# Patient Record
Sex: FEMALE | Race: WHITE | NOT HISPANIC OR LATINO | Employment: OTHER | URBAN - NONMETROPOLITAN AREA
[De-identification: names, ages, dates, MRNs, and addresses within clinical notes are randomized per-mention and may not be internally consistent; named-entity substitution may affect disease eponyms.]

---

## 2017-04-20 ENCOUNTER — TRANSCRIBE ORDERS (OUTPATIENT)
Dept: ADMINISTRATIVE | Facility: HOSPITAL | Age: 57
End: 2017-04-20

## 2017-04-20 DIAGNOSIS — Z13.9 SCREENING: Primary | ICD-10-CM

## 2017-05-22 ENCOUNTER — HOSPITAL ENCOUNTER (OUTPATIENT)
Dept: MAMMOGRAPHY | Facility: HOSPITAL | Age: 57
Discharge: HOME | End: 2017-05-22
Admitting: NURSE PRACTITIONER

## 2017-05-22 DIAGNOSIS — Z13.9 SCREENING: ICD-10-CM

## 2017-05-22 PROCEDURE — 77063 BREAST TOMOSYNTHESIS BI: CPT

## 2017-05-22 PROCEDURE — G0202 SCR MAMMO BI INCL CAD: HCPCS

## 2017-06-13 ENCOUNTER — OFFICE VISIT (OUTPATIENT)
Dept: NEUROLOGY | Facility: CLINIC | Age: 57
End: 2017-06-13

## 2017-06-13 VITALS
DIASTOLIC BLOOD PRESSURE: 60 MMHG | HEIGHT: 59 IN | HEART RATE: 80 BPM | SYSTOLIC BLOOD PRESSURE: 100 MMHG | WEIGHT: 165 LBS | OXYGEN SATURATION: 98 % | BODY MASS INDEX: 33.26 KG/M2

## 2017-06-13 DIAGNOSIS — G43.009 MIGRAINE WITHOUT AURA AND WITHOUT STATUS MIGRAINOSUS, NOT INTRACTABLE: Primary | ICD-10-CM

## 2017-06-13 PROCEDURE — 99213 OFFICE O/P EST LOW 20 MIN: CPT | Performed by: NURSE PRACTITIONER

## 2017-09-19 ENCOUNTER — TRANSCRIBE ORDERS (OUTPATIENT)
Dept: ADMINISTRATIVE | Facility: HOSPITAL | Age: 57
End: 2017-09-19

## 2017-09-19 DIAGNOSIS — J44.9 CHRONIC OBSTRUCTIVE PULMONARY DISEASE, UNSPECIFIED COPD TYPE (HCC): Primary | ICD-10-CM

## 2018-06-13 ENCOUNTER — OFFICE VISIT (OUTPATIENT)
Dept: NEUROLOGY | Facility: CLINIC | Age: 58
End: 2018-06-13

## 2018-06-13 VITALS
BODY MASS INDEX: 33.06 KG/M2 | WEIGHT: 164 LBS | HEIGHT: 59 IN | DIASTOLIC BLOOD PRESSURE: 78 MMHG | HEART RATE: 102 BPM | SYSTOLIC BLOOD PRESSURE: 112 MMHG | OXYGEN SATURATION: 100 %

## 2018-06-13 DIAGNOSIS — G43.009 MIGRAINE WITHOUT AURA AND WITHOUT STATUS MIGRAINOSUS, NOT INTRACTABLE: Primary | ICD-10-CM

## 2018-06-13 PROCEDURE — 99212 OFFICE O/P EST SF 10 MIN: CPT | Performed by: NURSE PRACTITIONER

## 2018-06-13 RX ORDER — PHENTERMINE HYDROCHLORIDE 37.5 MG/1
37.5 TABLET ORAL DAILY
Refills: 0 | COMMUNITY
Start: 2018-06-08 | End: 2019-10-17

## 2018-06-13 NOTE — PROGRESS NOTES
Subjective   Alicia Bender is a 57 y.o. female     Chief Complaint   Patient presents with   • Follow-up     Pt is here for a 1 year Fu for migraines. Pt states that she has done really well and has only had one or two migraines.        History of Present Illness     Pt is a pleasant 57 yr old female in clinic to follow up migraines Patient is very pleased with current treatment for her migraines.  Patient uses Topamax 200 mg twice a day with Imitrex.  She states she's only had 1-2 migraines in a year and she was having more than 15-16 a month.  States she is no longer having any side effects with work thought issues and again she is very pleased with current treatment.    The following portions of the patient's history were reviewed and updated as appropriate: allergies, current medications, past family history, past medical history, past social history, past surgical history and problem list.    Review of Systems   Constitutional: Negative for chills and fever.   HENT: Negative for congestion, ear pain, hearing loss, rhinorrhea and sore throat.    Eyes: Negative for pain, discharge and redness.   Respiratory: Negative for cough, shortness of breath, wheezing and stridor.    Cardiovascular: Negative for chest pain, palpitations and leg swelling.   Gastrointestinal: Negative for abdominal pain, constipation, nausea and vomiting.   Endocrine: Negative for cold intolerance, heat intolerance and polyphagia.   Genitourinary: Negative for dysuria, flank pain, frequency and urgency.   Musculoskeletal: Negative for joint swelling, myalgias, neck pain and neck stiffness.   Skin: Negative for pallor, rash and wound.   Allergic/Immunologic: Negative for environmental allergies.   Neurological: Positive for headaches. Negative for dizziness, tremors, seizures, syncope, facial asymmetry, speech difficulty, weakness, light-headedness and numbness.   Hematological: Negative for adenopathy.   Psychiatric/Behavioral: Negative for  "confusion and hallucinations. The patient is nervous/anxious.        Objective         Vitals:    06/13/18 1526   BP: 112/78   Pulse: 102   SpO2: 100%   Weight: 74.4 kg (164 lb)   Height: 149.9 cm (59\")     GENERAL: Patient is pleasant, cooperative, appears to be stated age.  Body habitus is endomorphic.  HEAD:  Head is normocephalic and atraumatic.    NECK: Neck are non-tender without thyromegaly or adenopathy.  Carotic upstrokes are 1+/4.  No cranial or cervical bruits.  The neck is supple with a full range of motion.   CARDIOVASCULAR:  Regular rate and rhythm with normal S1 and S2 without rub or gallop.  RESPIRATORY:  Clear to auscultation without wheezes or crackle   PSYCH:  Higher cortical function/mental status:  The patient is alert.  She  is oriented x3 to time, place and person.  Recent and the remote memory appear normal.  The patient has a good fund of knowledge.  There is no visual or auditory hallucination or suicidal or homicidal ideation.  SPEECH:There is no gross evidence of aphasia, dysarthria or agnosia.      MOTOR: Strength is 5/5 throughout with normal tone and bulk  No involuntary movements noted.    DTR: are 2/4 and symmetrical in the upper extremities, 2/4 and symmetrical at the knees   COORDINATION AND GAIT:  The patient walks with a narrow-based gait.      Results for orders placed or performed during the hospital encounter of 05/23/16   Basic metabolic panel   Result Value Ref Range    Sodium 151 (C) 137 - 145 mmol/L    Potassium 4.3 3.5 - 5.1 mmol/L    Chloride 111 (H) 98 - 107 mmol/L    CO2 23 (L) 26 - 30 mmol/L    Anion Gap 21 (H) 10 - 20 mmol/L    BUN 17 7 - 20 mg/dL    Creatinine 0.8 0.6 - 1.3 mg/dL    eGFR 74 mL/min    Glucose 90 74 - 98 mg/dL    Calcium 9.9 8.4 - 10.2 mg/dL     Assessment/Plan     Migraine stable: Patient stable with current therapy.  Continue Topamax 200 mg twice a day patient also has her Imitrex for rescue medication.  We'll have patient return to clinic in one " "year.  Discussion:  Migraine headaches are a major public health problem affecting more than 28 million persons in this country. Nearly 25 percent of women and 9 percent of men experience disabling migraines.    Migraine treatment depends on the duration and severity of pain, associated symptoms, degree of disability, and initial response to therapy. A widely prescribed and effective class of medications for migraines is the 5-HT1 receptor-specific agonists (\"triptans\"). Contraindications to their use include ischemic vascular conditions, vasospastic coronary disease, uncontrolled hypertension, or other significant cardiovascular disease.  Following appropriate management of acute migraine, patients should be evaluated for initiation of preventive therapy. Factors that should prompt consideration of preventive therapy include the occurrence of two or more migraines per month with disability lasting three or more days per month; failure of, contraindication for, or adverse events from acute treatments; use of abortive medication more than twice per week; and uncommon migraine conditions (e.g., hemiplegic migraine, migraine with prolonged aura, migrainous infarction). Patient preference and cost also should be considered. Evidence consistently supports the use of the beta blocker propranolol (Inderal) in migraine prophylaxis. Amitriptyline is a first-line agent for migraine prophylaxis and is the only antidepressant with consistent evidence supporting its effectiveness for this use. Divalproex (Depakote) and sodium valproate are well supported by evidence for use in migraine prevention. Topamax has also been studies for migraine prophlaxis in open label studies and double blind studies. Evidence does not support the use of diltiazem (Cardizem) in migraine prevention, and the evidence for several other calcium channel blockers, such as nifedipine (Procardia), is poor and suggests only modest effect  Menstrual Migraine " can present a challenge to clinician. Estrogen withdrawal has been shown to precipitate migraine headaches, and a sustained elevated level of estrogen will postpone the migraine. Transdermal estrogen started just before menstruation can provide a sustained low level of estrogen, decreasing the degree of estrogen decline, and thus may prevent induction of migraines

## 2019-02-05 ENCOUNTER — TRANSCRIBE ORDERS (OUTPATIENT)
Dept: GENERAL RADIOLOGY | Facility: HOSPITAL | Age: 59
End: 2019-02-05

## 2019-02-05 ENCOUNTER — HOSPITAL ENCOUNTER (OUTPATIENT)
Dept: GENERAL RADIOLOGY | Facility: HOSPITAL | Age: 59
Discharge: HOME | End: 2019-02-05
Admitting: FAMILY MEDICINE

## 2019-02-05 DIAGNOSIS — M79.642 LEFT HAND PAIN: Primary | ICD-10-CM

## 2019-02-05 DIAGNOSIS — M25.532 LEFT WRIST PAIN: ICD-10-CM

## 2019-02-05 DIAGNOSIS — M79.602 PAIN OF LEFT UPPER EXTREMITY: ICD-10-CM

## 2019-02-05 DIAGNOSIS — M79.642 LEFT HAND PAIN: ICD-10-CM

## 2019-02-05 PROCEDURE — 73090 X-RAY EXAM OF FOREARM: CPT

## 2019-02-05 PROCEDURE — 73110 X-RAY EXAM OF WRIST: CPT

## 2019-02-05 PROCEDURE — 73130 X-RAY EXAM OF HAND: CPT

## 2019-02-11 NOTE — TELEPHONE ENCOUNTER
Patient called and said that her pharmacy told her to call our office and let us know that her topamax needs a PA. Thanks.

## 2019-03-15 ENCOUNTER — DOCUMENTATION (OUTPATIENT)
Dept: NUTRITION | Facility: HOSPITAL | Age: 59
End: 2019-03-15

## 2019-03-15 NOTE — PROGRESS NOTES
Nutrition Services    Patient Name:  Alicia Bender  YOB: 1960  MRN: [id number]  Admit Date:  (Not on file)    RD and scheduling unable to schedule pt by phone, RD mailed nutrition follow-up letter this date. RD to await response for two weeks before closing the pt chart.    Electronically signed by:  Stacey Bay RD  03/15/19 2:24 PM

## 2019-04-05 ENCOUNTER — DOCUMENTATION (OUTPATIENT)
Dept: NUTRITION | Facility: HOSPITAL | Age: 59
End: 2019-04-05

## 2019-04-05 NOTE — PROGRESS NOTES
Nutrition Services    Patient Name:  Alicia Bender  YOB: 1960  MRN: [id number]  Admit Date:  (Not on file)    RD to close pt chart at this time. No response to follow-up letter sent over two weeks ago. RD to notify referring provider. Thank you.    Electronically signed by:  Stacey Bay RD  04/05/19 2:53 PM

## 2019-07-09 ENCOUNTER — TELEPHONE (OUTPATIENT)
Dept: NEUROLOGY | Facility: CLINIC | Age: 59
End: 2019-07-09

## 2019-07-09 NOTE — TELEPHONE ENCOUNTER
Patient has not been seen in a year. I tried to call to let the patient know she will need a follow up apt. But there was no answer or option for VM.

## 2019-09-25 ENCOUNTER — TRANSCRIBE ORDERS (OUTPATIENT)
Dept: NUCLEAR MEDICINE | Facility: HOSPITAL | Age: 59
End: 2019-09-25

## 2019-09-25 ENCOUNTER — TRANSCRIBE ORDERS (OUTPATIENT)
Dept: ULTRASOUND IMAGING | Facility: HOSPITAL | Age: 59
End: 2019-09-25

## 2019-09-25 DIAGNOSIS — R10.13 EPIGASTRIC PAIN: Primary | ICD-10-CM

## 2019-10-03 ENCOUNTER — HOSPITAL ENCOUNTER (OUTPATIENT)
Dept: ULTRASOUND IMAGING | Facility: HOSPITAL | Age: 59
Discharge: HOME | End: 2019-10-03
Admitting: FAMILY MEDICINE

## 2019-10-03 DIAGNOSIS — R10.13 EPIGASTRIC PAIN: ICD-10-CM

## 2019-10-03 PROCEDURE — 76705 ECHO EXAM OF ABDOMEN: CPT

## 2019-10-17 ENCOUNTER — OFFICE VISIT (OUTPATIENT)
Dept: GASTROENTEROLOGY | Facility: CLINIC | Age: 59
End: 2019-10-17

## 2019-10-17 VITALS
WEIGHT: 148 LBS | BODY MASS INDEX: 29.84 KG/M2 | HEART RATE: 78 BPM | SYSTOLIC BLOOD PRESSURE: 126 MMHG | DIASTOLIC BLOOD PRESSURE: 80 MMHG | HEIGHT: 59 IN | TEMPERATURE: 98.8 F

## 2019-10-17 DIAGNOSIS — R13.10 DYSPHAGIA, UNSPECIFIED TYPE: Chronic | ICD-10-CM

## 2019-10-17 DIAGNOSIS — Z12.11 COLON CANCER SCREENING: ICD-10-CM

## 2019-10-17 DIAGNOSIS — R11.0 NAUSEA: Chronic | ICD-10-CM

## 2019-10-17 DIAGNOSIS — R19.7 DIARRHEA, UNSPECIFIED TYPE: Chronic | ICD-10-CM

## 2019-10-17 DIAGNOSIS — R10.32 LEFT LOWER QUADRANT ABDOMINAL PAIN: Chronic | ICD-10-CM

## 2019-10-17 DIAGNOSIS — R63.4 WEIGHT LOSS: Primary | Chronic | ICD-10-CM

## 2019-10-17 PROBLEM — G47.00 INSOMNIA: Status: ACTIVE | Noted: 2019-10-17

## 2019-10-17 PROCEDURE — 99214 OFFICE O/P EST MOD 30 MIN: CPT | Performed by: NURSE PRACTITIONER

## 2019-10-17 RX ORDER — ALENDRONATE SODIUM 70 MG/1
70 TABLET ORAL
COMMUNITY

## 2019-10-17 RX ORDER — SODIUM CHLORIDE 9 MG/ML
70 INJECTION, SOLUTION INTRAVENOUS CONTINUOUS PRN
Status: CANCELLED | OUTPATIENT
Start: 2019-10-17

## 2019-10-17 RX ORDER — FLUTICASONE PROPIONATE 50 MCG
SPRAY, SUSPENSION (ML) NASAL DAILY
COMMUNITY

## 2019-10-17 RX ORDER — ESOMEPRAZOLE MAGNESIUM 40 MG/1
40 CAPSULE, DELAYED RELEASE ORAL
COMMUNITY

## 2019-10-17 RX ORDER — MELOXICAM 15 MG/1
15 TABLET ORAL DAILY PRN
COMMUNITY

## 2019-10-17 RX ORDER — ONDANSETRON 4 MG/1
4 TABLET, FILM COATED ORAL EVERY 8 HOURS PRN
Qty: 30 TABLET | Refills: 1 | Status: SHIPPED | OUTPATIENT
Start: 2019-10-17

## 2019-10-17 NOTE — PATIENT INSTRUCTIONS
1. Antireflux measures: Avoid fried, fatty foods, alcohol, chocolate, coffee, tea,  soft drinks, peppermint and spearmint, spicy foods, tomatoes and tomato based foods, onion based foods, and smoking. Other antireflux measures include weight reduction if overweight, avoiding tight clothing around the abdomen, elevating the head of the bed 6 inches with blocks under the head board, and don't drink or eat before going to bed and avoid lying down immediately after meals.  2. Nexium 40 mg 1 tablet by mouth in the am 30 minutes before breakfast.  3. Zofran 4 mg 1 po every 8 hours as needed for nausea.   4. High fiber diet with liberal water intake.   5. Upper endoscopy-EGD: Description of the procedure, risks, benefits, alternatives and options, including nonoperative options, were discussed with the patient in detail. The patient understands and wishes to proceed.  6. Colonoscopy: Description of the procedure, risks, benefits, alternatives and options, including nonoperative options, were discussed with the patient in detail. The patient understands and wishes to proceed.

## 2019-10-17 NOTE — PROGRESS NOTES
Chief Complaint   Patient presents with   • Nausea   • Abdominal Pain   • Diarrhea     The patient has a history of weight loss starting in June 2019. The patient had lost about 10 pounds intentionally, but then she continued to lose another 20 pounds unintentionally.  She was losing 2-4 pounds per week. The patient has gained about 2 1/2 pounds since last week. The patient has had loss of appetite. Of interest, the unintentional weight loss started after taking Topamax. She stopped drinking sodas at that time.    There is a history of left lower quadrant pain since June 2019. The patient has the pain daily. Eating makes the pain worse. The pain is mild and a dull, cramping pain. The patient is not taking anything for the pain.     The patient has a history of diarrhea that started a little over a week ago that lasted 4 days. Diarrhea started after taking antibiotics for a UTI.  She was having diarrhea several times per day during that time. She has not had diarrhea in the past 3-4 days. There is no history of bright red blood per rectum or melena.     The patient has had nausea off and on since June 2019. The patient was having nausea several times per week. Nausea has improved. She may have nausea once per week or less. Eating makes nausea worse if she eats a large meal. The patient denies vomiting.     The patient has a history of difficulty swallowing for the past 3-4 weeks. The patient had difficulty swallowing 2-3 times. The patient was having difficulty swallowing solids and liquids. She has been taking Nexium with improvement of swallowing. The patient denies heartburn.     The patient's last colonoscopy was in 2016. There is no family history of colon cancer.    Abdominal Pain   This is a recurrent problem. Episode onset: June 2019. The onset quality is gradual. The problem occurs daily. The problem has been unchanged. The pain is located in the LLQ. The pain is mild. The quality of the pain is cramping and  dull. Associated symptoms include arthralgias, diarrhea, a fever, headaches, myalgias and weight loss. Pertinent negatives include no constipation, dysuria, hematuria, nausea or vomiting. The pain is aggravated by eating. The pain is relieved by nothing. She has tried nothing for the symptoms. Prior diagnostic workup includes ultrasound.   Nausea   This is a recurrent problem. Episode onset: June 2019. The problem occurs intermittently. The problem has been gradually improving. Associated symptoms include abdominal pain, arthralgias, chest pain (tightness), chills, coughing, fatigue, a fever, headaches and myalgias. Pertinent negatives include no joint swelling, nausea, rash or vomiting. The symptoms are aggravated by eating. She has tried nothing for the symptoms.   Diarrhea    This is a new problem. The current episode started 1 to 4 weeks ago. The problem occurs 2 to 4 times per day. The problem has been rapidly improving. Diarrhea characteristics: loose. The patient states that diarrhea awakens her from sleep. Associated symptoms include abdominal pain, arthralgias, chills, coughing, a fever, headaches, myalgias and weight loss. Pertinent negatives include no vomiting. Nothing aggravates the symptoms. Risk factors include recent antibiotic use. She has tried nothing for the symptoms.   Weight Loss   This is a chronic problem. Episode onset: June 2019. The problem occurs intermittently. The problem has been gradually worsening. Associated symptoms include abdominal pain, arthralgias, chest pain (tightness), chills, coughing, fatigue, a fever, headaches and myalgias. Pertinent negatives include no joint swelling, nausea, rash or vomiting. Nothing aggravates the symptoms. She has tried nothing for the symptoms.   Difficulty Swallowing   This is a recurrent problem. The current episode started more than 1 month ago. The problem occurs intermittently. The problem has been waxing and waning. Associated symptoms  include abdominal pain, arthralgias, chest pain (tightness), chills, coughing, fatigue, a fever, headaches and myalgias. Pertinent negatives include no joint swelling, nausea, rash or vomiting. The symptoms are aggravated by drinking and eating. Treatments tried: Nexium. The treatment provided significant relief.     Review of Systems   Constitutional: Positive for chills, fatigue, fever, unexpected weight change and weight loss. Negative for appetite change.   HENT: Negative for mouth sores, nosebleeds and trouble swallowing.    Eyes: Positive for itching. Negative for discharge and redness.   Respiratory: Positive for cough and shortness of breath. Negative for apnea.    Cardiovascular: Positive for chest pain (tightness). Negative for palpitations and leg swelling.   Gastrointestinal: Positive for abdominal pain and diarrhea. Negative for abdominal distention, anal bleeding, blood in stool, constipation, nausea and vomiting.   Endocrine: Negative for cold intolerance, heat intolerance and polydipsia.   Genitourinary: Positive for urgency. Negative for dysuria and hematuria.   Musculoskeletal: Positive for arthralgias and myalgias. Negative for joint swelling.   Skin: Negative for rash.   Allergic/Immunologic: Negative for food allergies and immunocompromised state.   Neurological: Positive for dizziness and headaches. Negative for seizures and syncope.   Hematological: Negative for adenopathy. Does not bruise/bleed easily.   Psychiatric/Behavioral: Negative for dysphoric mood. The patient is nervous/anxious. The patient is not hyperactive.      Patient Active Problem List   Diagnosis   • Abdominal bloating   • Migraine   • Weight gain   • Colon cancer screening   • Diarrhea   • Insomnia   • Weight loss   • Left lower quadrant abdominal pain   • Nausea   • Dysphagia     Past Medical History:   Diagnosis Date   • Anxiety    • Asthma    • Back pain    • Colon polyp 04/18/2016   • COPD (chronic obstructive pulmonary  disease) (CMS/HCC)    • Depression    • Infectious viral hepatitis    • Migraine    • Migraines    • Osteoarthritis    • Vision problem      Past Surgical History:   Procedure Laterality Date   •  SECTION     • COLONOSCOPY  2016   • TONSILLECTOMY  1966    as a baby   • TUBAL ABDOMINAL LIGATION       Family History   Problem Relation Age of Onset   • Cirrhosis Neg Hx    • Liver cancer Neg Hx    • Liver disease Neg Hx    • Colon cancer Neg Hx    • Crohn's disease Neg Hx    • Ulcerative colitis Neg Hx    • Esophageal cancer Neg Hx    • Stomach cancer Neg Hx      Social History     Tobacco Use   • Smoking status: Never Smoker   • Smokeless tobacco: Never Used   Substance Use Topics   • Alcohol use: Yes     Comment: moderate use       Current Outpatient Medications:   •  albuterol (PROVENTIL HFA;VENTOLIN HFA) 108 (90 BASE) MCG/ACT inhaler, Albuterol AERS; Patient Sig: Albuterol AERS ; 0; -2015; Active, Disp: , Rfl:   •  alendronate (FOSAMAX) 70 MG tablet, Take 70 mg by mouth Every 7 (Seven) Days., Disp: , Rfl:   •  cetirizine (ZyrTEC) 10 MG tablet, Take 10 mg by mouth., Disp: , Rfl:   •  Cholecalciferol (VITAMIN D3) 2000 UNITS capsule, Take 1 capsule by mouth daily., Disp: , Rfl:   •  esomeprazole (nexIUM) 40 MG capsule, Take 40 mg by mouth Every Morning Before Breakfast., Disp: , Rfl:   •  fluticasone (FLONASE) 50 MCG/ACT nasal spray, into the nostril(s) as directed by provider Daily., Disp: , Rfl:   •  meloxicam (MOBIC) 15 MG tablet, Take 15 mg by mouth Daily As Needed., Disp: , Rfl:   •  Multiple Vitamins-Minerals (WOMENS MULTI VITAMIN & MINERAL) tablet, Take 1 tablet by mouth Daily., Disp: , Rfl:   •  naproxen (NAPROSYN) 500 MG tablet, Take  by mouth., Disp: , Rfl:   •  SUMAtriptan (IMITREX) 25 MG tablet, Take 1 tablet by mouth Daily As Needed (migraine)., Disp: 9 tablet, Rfl: 4  •  topiramate (TOPAMAX) 200 MG tablet, Take 1 tablet by mouth 2 (Two) Times a Day., Disp: 60 tablet, Rfl:  "4  •  ondansetron (ZOFRAN) 4 MG tablet, Take 1 tablet by mouth Every 8 (Eight) Hours As Needed for Nausea or Vomiting., Disp: 30 tablet, Rfl: 1  •  venlafaxine XR (EFFEXOR-XR) 75 MG 24 hr capsule, Take 75 mg by mouth Daily., Disp: , Rfl:     Allergies   Allergen Reactions   • Other Diarrhea     \"Teramycin\"   • Oxytetracycline    • Penicillins Diarrhea     Blood pressure 126/80, pulse 78, temperature 98.8 °F (37.1 °C), height 149.9 cm (59\"), weight 67.1 kg (148 lb).    Physical Exam   Constitutional: She is oriented to person, place, and time. She appears well-developed and well-nourished. No distress.   HENT:   Head: Normocephalic and atraumatic.   Right Ear: Hearing and external ear normal.   Left Ear: Hearing and external ear normal.   Nose: Nose normal.   Mouth/Throat: Oropharynx is clear and moist and mucous membranes are normal. Mucous membranes are not pale, not dry and not cyanotic. No oral lesions. No oropharyngeal exudate.   Eyes: Conjunctivae and EOM are normal. Right eye exhibits no discharge. Left eye exhibits no discharge.   Neck: Trachea normal. Neck supple. No JVD present. No edema present. No thyroid mass and no thyromegaly present.   Cardiovascular: Normal rate, regular rhythm, S2 normal and normal heart sounds. Exam reveals no gallop, no S3 and no friction rub.   No murmur heard.  Pulmonary/Chest: Effort normal and breath sounds normal. No respiratory distress. She exhibits no tenderness.   Abdominal: Normal appearance and bowel sounds are normal. She exhibits no distension, no ascites and no mass. There is no splenomegaly or hepatomegaly. There is no tenderness. There is no rigidity, no rebound and no guarding. No hernia.     Vascular Status -  Her right foot exhibits no edema. Her left foot exhibits no edema.  Lymphadenopathy:     She has no cervical adenopathy.        Left: No supraclavicular adenopathy present.   Neurological: She is alert and oriented to person, place, and time. She has normal " strength. No cranial nerve deficit or sensory deficit.   Skin: No rash noted. She is not diaphoretic. No cyanosis. No pallor. Nails show no clubbing.   Psychiatric: She has a normal mood and affect.   Nursing note and vitals reviewed.  Stigmata of chronic liver disease:  None.  Asterixis:  None.    Laboratory Tests:   Upon review of records:    Dated 9/19/2019 glucose 86 BUN 18 creatinine 0.86 sodium 145 potassium 4.4 chloride 110 CO2 18 calcium 9.4 albumin 4.8 total bilirubin <0.2 alkaline phosphatase 68 ALT 13 AST 15 WBC 7.0 hematocrit 40.8 hemoglobin 13.4 platelet count 215 MCV 88,   Lipase 43, amylase 72, H. pylori breath test: Negative    Abdominal Imaging:  Upon review of records:    Gallbladder ultrasound dated 10/3/2019 reveals no evidence of gallstones, wall thickening or pericholecystic fluid is identified. Visualized liver parenchyma appears normal. No intrahepatic duct dilatation is identified.No extrahepatic biliary ductal dilatation is identified. There are 2 right renal cysts identified. The largest measures up to 2.1 cm.    Procedures:  Upon review of records:    Colonoscopy dated 4/18/2016 reveals scant left-sided diverticulosis.  Colon polyps.  Internal hemorrhoids.  No endoscopic evidence of ileitis or colitis was seen.  Random biopsies were obtained from the colon upon withdrawal of the scope.  Cecum polyp x4 biopsy reveals portion of tubular adenoma.  Fragments of colonic mucosa with prominent lymphoid aggregates.  No high-grade dysplasia identified.  Random colon biopsy reveals multiple portions of colonic mucosa with no significant histopathologic change.  No features of acute colitis, chronic colitis, collagenous colitis or lymphocytic colitis.  Rectal polyps reveals rectal mucosa with focal hyperplastic changes.    Assessment:      ICD-10-CM ICD-9-CM   1. Weight loss R63.4 783.21   2. Left lower quadrant abdominal pain R10.32 789.04   3. Diarrhea, unspecified type R19.7 787.91   4. Nausea  R11.0 787.02   5. Dysphagia, unspecified type R13.10 787.20   6. Colon cancer screening Z12.11 V76.51       Plan/  Patient Instructions   1. Antireflux measures: Avoid fried, fatty foods, alcohol, chocolate, coffee, tea,  soft drinks, peppermint and spearmint, spicy foods, tomatoes and tomato based foods, onion based foods, and smoking. Other antireflux measures include weight reduction if overweight, avoiding tight clothing around the abdomen, elevating the head of the bed 6 inches with blocks under the head board, and don't drink or eat before going to bed and avoid lying down immediately after meals.  2. Nexium 40 mg 1 tablet by mouth in the am 30 minutes before breakfast.  3. Zofran 4 mg 1 po every 8 hours as needed for nausea.   4. High fiber diet with liberal water intake.   5. Upper endoscopy-EGD: Description of the procedure, risks, benefits, alternatives and options, including nonoperative options, were discussed with the patient in detail. The patient understands and wishes to proceed.  6. Colonoscopy: Description of the procedure, risks, benefits, alternatives and options, including nonoperative options, were discussed with the patient in detail. The patient understands and wishes to proceed.     YANET Barnard

## 2019-11-11 ENCOUNTER — ANESTHESIA (OUTPATIENT)
Dept: GASTROENTEROLOGY | Facility: HOSPITAL | Age: 59
End: 2019-11-11

## 2019-11-11 ENCOUNTER — HOSPITAL ENCOUNTER (OUTPATIENT)
Facility: HOSPITAL | Age: 59
Setting detail: HOSPITAL OUTPATIENT SURGERY
Discharge: HOME | End: 2019-11-11
Attending: INTERNAL MEDICINE | Admitting: INTERNAL MEDICINE

## 2019-11-11 ENCOUNTER — ANESTHESIA EVENT (OUTPATIENT)
Dept: GASTROENTEROLOGY | Facility: HOSPITAL | Age: 59
End: 2019-11-11

## 2019-11-11 VITALS
SYSTOLIC BLOOD PRESSURE: 123 MMHG | HEART RATE: 82 BPM | BODY MASS INDEX: 30.04 KG/M2 | WEIGHT: 149 LBS | DIASTOLIC BLOOD PRESSURE: 74 MMHG | RESPIRATION RATE: 16 BRPM | HEIGHT: 59 IN | TEMPERATURE: 97 F | OXYGEN SATURATION: 98 %

## 2019-11-11 DIAGNOSIS — R13.10 DYSPHAGIA, UNSPECIFIED TYPE: ICD-10-CM

## 2019-11-11 DIAGNOSIS — R63.4 WEIGHT LOSS: ICD-10-CM

## 2019-11-11 DIAGNOSIS — R11.0 NAUSEA: ICD-10-CM

## 2019-11-11 PROCEDURE — 25010000002 PROPOFOL 200 MG/20ML EMULSION: Performed by: NURSE ANESTHETIST, CERTIFIED REGISTERED

## 2019-11-11 PROCEDURE — C1726 CATH, BAL DIL, NON-VASCULAR: HCPCS | Performed by: INTERNAL MEDICINE

## 2019-11-11 PROCEDURE — 43239 EGD BIOPSY SINGLE/MULTIPLE: CPT | Performed by: INTERNAL MEDICINE

## 2019-11-11 PROCEDURE — 43249 ESOPH EGD DILATION <30 MM: CPT | Performed by: INTERNAL MEDICINE

## 2019-11-11 PROCEDURE — 25010000002 ONDANSETRON PER 1 MG: Performed by: NURSE ANESTHETIST, CERTIFIED REGISTERED

## 2019-11-11 RX ORDER — SODIUM CHLORIDE 9 MG/ML
70 INJECTION, SOLUTION INTRAVENOUS CONTINUOUS PRN
Status: DISCONTINUED | OUTPATIENT
Start: 2019-11-11 | End: 2019-11-11 | Stop reason: HOSPADM

## 2019-11-11 RX ORDER — PROPOFOL 10 MG/ML
INJECTION, EMULSION INTRAVENOUS AS NEEDED
Status: DISCONTINUED | OUTPATIENT
Start: 2019-11-11 | End: 2019-11-11 | Stop reason: SURG

## 2019-11-11 RX ORDER — SIMETHICONE 20 MG/.3ML
EMULSION ORAL AS NEEDED
Status: DISCONTINUED | OUTPATIENT
Start: 2019-11-11 | End: 2019-11-11 | Stop reason: HOSPADM

## 2019-11-11 RX ORDER — LIDOCAINE HYDROCHLORIDE 20 MG/ML
INJECTION, SOLUTION INTRAVENOUS AS NEEDED
Status: DISCONTINUED | OUTPATIENT
Start: 2019-11-11 | End: 2019-11-11 | Stop reason: SURG

## 2019-11-11 RX ORDER — ONDANSETRON 2 MG/ML
INJECTION INTRAMUSCULAR; INTRAVENOUS AS NEEDED
Status: DISCONTINUED | OUTPATIENT
Start: 2019-11-11 | End: 2019-11-11 | Stop reason: SURG

## 2019-11-11 RX ADMIN — PROPOFOL 50 MG: 10 INJECTION, EMULSION INTRAVENOUS at 08:08

## 2019-11-11 RX ADMIN — ONDANSETRON 4 MG: 2 INJECTION INTRAMUSCULAR; INTRAVENOUS at 07:59

## 2019-11-11 RX ADMIN — PROPOFOL 50 MG: 10 INJECTION, EMULSION INTRAVENOUS at 08:04

## 2019-11-11 RX ADMIN — SODIUM CHLORIDE 70 ML/HR: 9 INJECTION, SOLUTION INTRAVENOUS at 06:53

## 2019-11-11 RX ADMIN — PROPOFOL 100 MG: 10 INJECTION, EMULSION INTRAVENOUS at 07:59

## 2019-11-11 RX ADMIN — LIDOCAINE HYDROCHLORIDE 60 MG: 20 INJECTION, SOLUTION INTRAVENOUS at 07:59

## 2019-11-11 NOTE — DISCHARGE INSTRUCTIONS
No pushing, pulling, tugging, heavy lifting, or strenuous activity.  No major decision making, driving, or drinking alcoholic beverages for 24 hours. (due to the medications you have received)  Always use good hand hygiene/washing techniques.  NO driving while taking pain medications.    To assist you in voiding:  Drink plenty of fluids  Listen to running water while attempting to void.    If you are unable to urinate and you have an uncomfortable urge to void or it has been   6 hours since you were discharged, return to the Emergency Room    *********************************************************************    Postprocedure instructions:  1. Nothing by mouth until fully alert and as specified below .  2. Bedrest until fully alert.  3. Vital signs as routine.    Diet:   1. Nothing by mouth for 60 minutes, then if no chest pains the patient may have Clear liquids diet (No Sodas) for 4 hours.  2. May advance to soft diet in 4 hours if no chest pains, Fever or chills, nausea vomiting or bleeding.    Other instructions:  1. The patient may eat in upright position, chew well, take small bites and take medications in upright position.   2. The patient should drink water after 3-4 bites, and liberally with medications.    3. The patient should remain upright for about 10 minutes after eating and taking oral medications.    Blood Thinner and other medications Directions:  Avoid Aspirin & other NSAIDS for 3 days.  Tylenol is okay.    Other instructions:  The patient should avoid medications that have a potential to cause pill esophagitis including potassium pills, tetracycline capsules, iron pills, NSAIDs and bisphosphonates.    Follow-up:    DR. MARK WATT in 4 weeks.Office phone # [phone].  ****************************************************************************************************    Notes to the patient and the family from Dr. Watt.     Dear patient/family member,    Findings on today's procedure are  as follows:    1. Esophagitis. Inflammation of the esophagus.  2. Esophageal rings. Schatzki's ring. These areas were stretched.   3. Inflammation of the stomach. Gastritis.  4. Small sliding hiatal hernia.  5. No cancer. No active ulcers.     Recommendations:    1. Nexium (esomeprazole) capsule 40 mg. Take one capsule orally in the morning half an hour before eating daily.  2. Zofran 4 mg tablet.  1-to 3 times a day by mouth as needed for nausea.  3. Other instructions as above.    Should you have more questions please do not hesitate to talk to the nurse who can call me and let me talk to you.     I hope you feel better.    Moses Watt M.D., FACP, FACG.

## 2019-11-11 NOTE — INTERVAL H&P NOTE
"  H&P reviewed. The patient was examined and there are no changes to the H&P.       No recent shortness of breath or chest pains.      Blood pressure 116/79, pulse 80, temperature 98 °F (36.7 °C), temperature source Temporal, resp. rate 16, height 149.9 cm (59\"), weight 67.6 kg (149 lb), SpO2 98 %.    Chest: clear to auscultation.  Cardiac exam: No S3 no murmurs.   Abdomen: soft bowel sounds present nondistended nontender.        "

## 2019-11-11 NOTE — ANESTHESIA PREPROCEDURE EVALUATION
Anesthesia Evaluation     Patient summary reviewed and Nursing notes reviewed   no history of anesthetic complications:  NPO Solid Status: > 8 hours  NPO Liquid Status: > 8 hours           Airway   Mallampati: I  TM distance: >3 FB  Neck ROM: full  no difficulty expected  Dental - normal exam     Pulmonary - normal exam   (+) COPD, asthma,  Cardiovascular - negative cardio ROS and normal exam        Neuro/Psych  (+) headaches,     GI/Hepatic/Renal/Endo    (+)  GERD,  hepatitis, liver disease,     Musculoskeletal     (+) back pain,   Abdominal    Substance History - negative use     OB/GYN negative ob/gyn ROS         Other - negative ROS                       Anesthesia Plan    ASA 3     MAC     intravenous induction     Anesthetic plan, all risks, benefits, and alternatives have been provided, discussed and informed consent has been obtained with: patient.

## 2019-11-11 NOTE — OP NOTE
PROCEDURE:  Upper Endoscopy with serial dilation of the esophagus using 15-16.5-18 mm CRE balloon to 18 mm, 2 cold biopsy polypectomies and biopsies.    DATE OF PROCEDURE: November 11, 2019.    REFERRING PROVIDER:  Kelli Serna MD.     INSTRUMENT:    Olympus GIF H180 J video endoscope.       INDICATIONS OF THE PROCEDURE:   This is a 59-year-old white female with history of recurrent dysphagia, nausea and weight loss.  Currently undergoing upper endoscopy for further evaluation.     BIOPSIES: Second portion of duodenum.  Gastric antrum, body and fundus.  2 cold biopsy pulpectomies were performed one at the body of the stomach and one at the cardia.  Biopsies were obtained from the mid and distal esophagus-subtle concentric rings.     MEDICATIONS:  MAC.     PHOTOGRAPHS:  Photographs were included in the medical records.     CONSENT/PREPROCEDURE EVALUATION:  Risks, benefits, alternatives and options of the procedure including risks of anesthesia/sedation were discussed and informed consent was obtained prior to the procedure. History and physical examination were performed and nothing precluded the test.     REPORT:  The patient was placed in left lateral decubitus position. Once under the influence of IV sedation, the instrument was inserted into the mouth and esophagus was intubated under direct vision without difficulty.    Visualized portions of the hypopharyngeal and laryngopharyngeal areas including partial view of the vocal cords did not reveal significant pathology.    Esophagus: Subtle concentric rings were noted within the proximal, mid and distal esophagus.  Biopsies were obtained from the mid and distal esophagus.  A Schatzki's type ring was seen.  Erythematous distal esophagitis was noted.   A small sliding hiatal hernia less than 3 cm was noted.  No Fitch's esophagus was seen.  A small 2 to 3 mm sessile polyp was noted at cardia in forward-viewing.  This was removed with cold biopsy  forceps.     Stomach:  Antrum:  Erythematous gastritis.  Angulus, lesser and greater curves: Normal.  Retroflex examination: Sliding hiatal hernia.  Cardia and fundus:  Normal.     Body of the stomach: Erythematous gastritis.  Good distensibility of the stomach was achieved no giant folds were noted.   Biopsies were obtained from the gastric antrum,  body and fundus.  A 3 mm sessile polyp at the lower body of the stomach was removed with cold biopsy forceps.    Pylorus and pyloric channel:  normal.     Duodenum:  Bulb: Normal.  Second portion: normal.  No scalloping was seen in the second portion of duodenum.  Biopsies were obtained from the second portion of duodenum.    Intervention: Distal, mid and more proximal esophagus were dilated using 15-16.5-18 mm CRE balloon to 18 mm under vision without difficulty.  No active bleeding was noted.     The upper GI tract was decompressed and the scope was pulled out of the patient. The patient tolerated the procedure well.     DIAGNOSES:     1. Subtle concentric rings involving the proximal, mid and distal esophagus.  Status post serial dilation to 18 mm.  2. Schatzki's type ring.  3. Small sliding hiatal hernia less than 3 cm.  4. Erythematous gastritis.  5. Gastric polyps.      RECOMMENDATIONS:  1.  Dietary instructions.  2.  Nexium (esomeprazole) capsule 40 mg. Take one capsule orally in the morning half an hour before eating daily.  3.  Follow biopsies.  4.  Follow up in office.     Thank you very much for letting me participate in the care of this patient. Please do not hesitate to call me if you have any questions.

## 2019-11-11 NOTE — ANESTHESIA POSTPROCEDURE EVALUATION
Patient: Alicia Bender    Procedure Summary     Date:  11/11/19 Room / Location:  [facility] / [facility]    Anesthesia Start:  0757 Anesthesia Stop:  0810    Procedure:  ESOPHAGOGASTRODUODENOSCOPY WITH BALLOON DILITATION, BIOPSIES AND POLYPECTOMIES (N/A Esophagus) Diagnosis:       Weight loss      Nausea      Dysphagia, unspecified type      (Weight loss [R63.4])      (Nausea [R11.0])      (Dysphagia, unspecified type [R13.10])    Surgeon:  Moses Watt MD Provider:  Blayne Montes CRNA    Anesthesia Type:  MAC ASA Status:  3          Anesthesia Type: MAC  Last vitals  BP   123/74 (11/11/19 0940)   Temp   97 °F (36.1 °C) (11/11/19 0815)   Pulse   82 (11/11/19 0940)   Resp   16 (11/11/19 0940)     SpO2   98 % (11/11/19 0940)     Post Anesthesia Care and Evaluation    Patient location during evaluation: bedside  Patient participation: complete - patient participated  Level of consciousness: awake  Pain score: 0  Pain management: adequate  Airway patency: patent  Anesthetic complications: No anesthetic complications  PONV Status: controlled  Cardiovascular status: acceptable and stable  Respiratory status: acceptable and room air  Hydration status: acceptable

## 2019-11-15 LAB
LAB AP CASE REPORT: NORMAL
PATH REPORT.FINAL DX SPEC: NORMAL

## 2019-11-22 ENCOUNTER — HOSPITAL ENCOUNTER (OUTPATIENT)
Facility: HOSPITAL | Age: 59
Setting detail: HOSPITAL OUTPATIENT SURGERY
End: 2019-11-22
Attending: INTERNAL MEDICINE | Admitting: INTERNAL MEDICINE

## 2019-12-05 ENCOUNTER — TELEPHONE (OUTPATIENT)
Dept: GASTROENTEROLOGY | Facility: CLINIC | Age: 59
End: 2019-12-05

## 2019-12-05 DIAGNOSIS — B96.81 HELICOBACTER PYLORI GASTRITIS: Primary | ICD-10-CM

## 2019-12-05 DIAGNOSIS — K29.70 HELICOBACTER PYLORI GASTRITIS: Primary | ICD-10-CM

## 2019-12-05 RX ORDER — ESOMEPRAZOLE MAGNESIUM 40 MG/1
CAPSULE, DELAYED RELEASE ORAL
Qty: 28 CAPSULE | Refills: 0 | Status: SHIPPED | OUTPATIENT
Start: 2019-12-05

## 2019-12-05 RX ORDER — CLARITHROMYCIN 500 MG/1
TABLET, COATED ORAL
Qty: 28 TABLET | Refills: 0 | Status: SHIPPED | OUTPATIENT
Start: 2019-12-05

## 2019-12-05 RX ORDER — METRONIDAZOLE 250 MG/1
TABLET ORAL
Qty: 56 TABLET | Refills: 0 | Status: SHIPPED | OUTPATIENT
Start: 2019-12-05

## 2019-12-05 NOTE — TELEPHONE ENCOUNTER
----- Message from Suri Murray MA sent at 12/5/2019 12:48 PM EST -----  Contact: [phone]  Please advise.      ----- Message -----  From: Jerome Freeman RegSched Rep  Sent: 12/5/2019  10:33 AM  To: [facility]    The patient canceled her colonoscopy which was scheduled for 12/11/19.  She has a follow-up appointment on 12/09/19 to follow-up on her EGD but would like to get her results over the phone if possible.

## 2019-12-05 NOTE — TELEPHONE ENCOUNTER
Let her know biopsies were consistent with H. Pylori, a bacteria in the stomach that can cause gastritis. Otherwise, biopsies ok, no cancer. I sent in the treatment for H. Pylori: Nexium 40 mg 1 po twice per day, Clarithromycin 1 po twice per day, and Flagyl 1 po 4 times per day. She will take the treatment for 2 weeks. I have sent in probiotics to take while taking antibiotics. She can pick them up and get started, or she can wait until she comes in next week and we can discuss the results more in depth and she can start it after her appointment.

## 2019-12-06 NOTE — TELEPHONE ENCOUNTER
Information given to patient.  She will come in Monday, and then get the meds for treatment.  We should know by then if Nexium BID dosing is covered.

## 2019-12-09 ENCOUNTER — OFFICE VISIT (OUTPATIENT)
Dept: GASTROENTEROLOGY | Facility: CLINIC | Age: 59
End: 2019-12-09

## 2019-12-09 VITALS
TEMPERATURE: 97.9 F | RESPIRATION RATE: 15 BRPM | BODY MASS INDEX: 29.23 KG/M2 | DIASTOLIC BLOOD PRESSURE: 82 MMHG | HEIGHT: 59 IN | HEART RATE: 77 BPM | SYSTOLIC BLOOD PRESSURE: 122 MMHG | WEIGHT: 145 LBS

## 2019-12-09 DIAGNOSIS — K20.90 ESOPHAGITIS: ICD-10-CM

## 2019-12-09 DIAGNOSIS — K29.70 HELICOBACTER PYLORI GASTRITIS: ICD-10-CM

## 2019-12-09 DIAGNOSIS — B96.81 HELICOBACTER PYLORI GASTRITIS: ICD-10-CM

## 2019-12-09 DIAGNOSIS — R10.32 LEFT LOWER QUADRANT ABDOMINAL PAIN: Chronic | ICD-10-CM

## 2019-12-09 DIAGNOSIS — R13.10 DYSPHAGIA, UNSPECIFIED TYPE: Chronic | ICD-10-CM

## 2019-12-09 DIAGNOSIS — R11.0 NAUSEA: Chronic | ICD-10-CM

## 2019-12-09 DIAGNOSIS — Z12.11 COLON CANCER SCREENING: ICD-10-CM

## 2019-12-09 DIAGNOSIS — R53.83 FATIGUE, UNSPECIFIED TYPE: Chronic | ICD-10-CM

## 2019-12-09 DIAGNOSIS — R19.7 DIARRHEA, UNSPECIFIED TYPE: Chronic | ICD-10-CM

## 2019-12-09 DIAGNOSIS — R63.4 WEIGHT LOSS: Primary | Chronic | ICD-10-CM

## 2019-12-09 PROCEDURE — 99214 OFFICE O/P EST MOD 30 MIN: CPT | Performed by: NURSE PRACTITIONER

## 2019-12-09 NOTE — PATIENT INSTRUCTIONS
1. Antireflux measures: Avoid fried, fatty foods, alcohol, chocolate, coffee, tea,  soft drinks, peppermint and spearmint, spicy foods, tomatoes and tomato based foods, onion based foods, and smoking. Other antireflux measures include weight reduction if overweight, avoiding tight clothing around the abdomen, elevating the head of the bed 6 inches with blocks under the head board, and don't drink or eat before going to bed and avoid lying down immediately after meals.  2. Nexium 40 mg 1 by mouth in the am 30 minutes before breakfast.  3. Zofran 4 mg 1 po every 8 hours as needed for nausea.  4. Treatment for H. Pylori:       A. Metronidazole 250 mg 1 tablets by mouth four times a day x 14 days.       B. Clarithromycin 500 mg 1 tablet by mouth twice a day x 14 days.       C. Nexium 40 mg 1 tablet by mouth twice a day x 14 days.  5. Patient may take probiotics while taking antibiotics  6. CCK HIDA scan.  7. CBC, CMP, TSH, Lipase, IgA, tTg IgA  8. The patient may call for results.  9. Colonoscopy: Description of the procedure, risks, benefits, alternatives and options, including nonoperative options, were discussed with the patient in detail. The patient understands and wishes to discuss with her PCP first. The patient will call back to schedule.  10. Follow up: Follow up: 4-6 weeks

## 2019-12-09 NOTE — PROGRESS NOTES
"Chief Complaint   Patient presents with   • Follow-up   • Nausea   • Abdominal Pain     The patient is here for follow up. She has been very fatigued for several months. She will become very tired after doing any activity, such as showering. She has been told labs are \"ok\". There is no history of anemia. There is no history of liver disease. The patient cancelled her colonoscopy for this week as she thought she didn't need it.     The patient has a history of weight loss starting in June 2019. The patient had lost about 10 pounds intentionally, but then she continued to lose another 20 pounds unintentionally.  The patient has lost about 4 pounds since her last visit in November 2019.  The patient has had loss of appetite and does not eat as much as she used to.    The patient has had nausea off and on since June 2019. The patient is having nausea several times per week. Nausea has been unchanged. Eating makes nausea worse. The patient denies vomiting.     The patient had a history of difficulty swallowing about 2 months ago. Difficulty swallowing has resolved. She has not had further episodes of difficulty swallowing since her EGD with dilation. The patient denies heartburn.      There is a history of left lower quadrant pain since June 2019. The pain has improved. The patient has the pain 1-2 times per month. Eating makes the pain worse. The pain is mild and a dull, cramping pain. The patient is not taking anything for the pain. Diarrhea has resolved. The patient denies constipation. There is no history of bright red blood per rectum or melena.     The patient's last colonoscopy was in 2016. There is no family history of colon cancer.    Nausea   This is a chronic problem. Episode onset: June 2019. The problem occurs intermittently. The problem has been unchanged. Associated symptoms include arthralgias, fatigue, headaches and nausea. Pertinent negatives include no abdominal pain, chest pain, chills, coughing, " fever, joint swelling, myalgias, rash or vomiting.   Abdominal Pain   This is a chronic problem. Episode onset: June 2019. The onset quality is gradual. The problem occurs rarely. The problem has been rapidly improving. The pain is located in the LLQ. The pain is mild. The quality of the pain is dull and cramping. The abdominal pain does not radiate. Associated symptoms include arthralgias, headaches, hematuria, nausea and weight loss. Pertinent negatives include no constipation, diarrhea, dysuria, fever, myalgias or vomiting. The pain is aggravated by eating. The pain is relieved by nothing. She has tried proton pump inhibitors for the symptoms. The treatment provided significant relief. Prior diagnostic workup includes upper endoscopy and ultrasound.   Difficulty Swallowing   This is a recurrent problem. The current episode started more than 1 month ago. The problem has been resolved. Associated symptoms include arthralgias, fatigue, headaches and nausea. Pertinent negatives include no abdominal pain, chest pain, chills, coughing, fever, joint swelling, myalgias, rash or vomiting.   Weight Loss   This is a chronic problem. Episode onset: June 2019. The problem occurs intermittently. The problem has been gradually worsening. Associated symptoms include arthralgias, fatigue, headaches and nausea. Pertinent negatives include no abdominal pain, chest pain, chills, coughing, fever, joint swelling, myalgias, rash or vomiting. Nothing aggravates the symptoms. She has tried nothing for the symptoms.     Review of Systems   Constitutional: Positive for fatigue, unexpected weight change and weight loss. Negative for appetite change, chills and fever.   HENT: Positive for nosebleeds. Negative for mouth sores and trouble swallowing.    Eyes: Negative for discharge and redness.   Respiratory: Positive for shortness of breath. Negative for apnea and cough.    Cardiovascular: Negative for chest pain, palpitations and leg  swelling.   Gastrointestinal: Positive for nausea. Negative for abdominal distention, abdominal pain, anal bleeding, blood in stool, constipation, diarrhea and vomiting.   Endocrine: Negative for cold intolerance, heat intolerance and polydipsia.   Genitourinary: Positive for hematuria. Negative for dysuria and urgency.   Musculoskeletal: Positive for arthralgias. Negative for joint swelling and myalgias.   Skin: Negative for rash.   Allergic/Immunologic: Negative for food allergies and immunocompromised state.   Neurological: Positive for dizziness and headaches. Negative for seizures and syncope.   Hematological: Negative for adenopathy. Does not bruise/bleed easily.   Psychiatric/Behavioral: Negative for dysphoric mood. The patient is nervous/anxious. The patient is not hyperactive.      Patient Active Problem List   Diagnosis   • Abdominal bloating   • Migraine   • Weight gain   • Colon cancer screening   • Diarrhea   • Insomnia   • Weight loss   • Left lower quadrant abdominal pain   • Nausea   • Dysphagia   • Helicobacter pylori gastritis   • Esophagitis   • Fatigue     Past Medical History:   Diagnosis Date   • Abdominal pain    • Anxiety    • Asthma    • Back pain    • Colon polyp 2016   • COPD (chronic obstructive pulmonary disease) (CMS/HCC)    • Depression    • Diarrhea    • Dyspepsia    • Full dentures    • GERD (gastroesophageal reflux disease)    • Infectious viral hepatitis    • Migraine    • Migraines    • Nausea    • Osteoarthritis    • Vision problem      Past Surgical History:   Procedure Laterality Date   •  SECTION     • COLONOSCOPY  2016   • ENDOSCOPY N/A 2019    Procedure: ESOPHAGOGASTRODUODENOSCOPY WITH BALLOON DILITATION, BIOPSIES AND POLYPECTOMIES;  Surgeon: Moses Watt MD;  Location: [facility] ENDOSCOPY;  Service: Gastroenterology   • TONSILLECTOMY  1966    as a baby   • TUBAL ABDOMINAL LIGATION       Family History   Problem Relation Age of Onset   •  Cirrhosis Neg Hx    • Liver cancer Neg Hx    • Liver disease Neg Hx    • Colon cancer Neg Hx    • Crohn's disease Neg Hx    • Ulcerative colitis Neg Hx    • Esophageal cancer Neg Hx    • Stomach cancer Neg Hx      Social History     Tobacco Use   • Smoking status: Never Smoker   • Smokeless tobacco: Never Used   Substance Use Topics   • Alcohol use: Yes     Comment: moderate use       Current Outpatient Medications:   •  albuterol (PROVENTIL HFA;VENTOLIN HFA) 108 (90 BASE) MCG/ACT inhaler, Albuterol AERS; Patient Sig: Albuterol AERS ; 0; 16-Jun-2015; Active, Disp: , Rfl:   •  alendronate (FOSAMAX) 70 MG tablet, Take 70 mg by mouth Every 7 (Seven) Days., Disp: , Rfl:   •  Budesonide-Formoterol Fumarate (SYMBICORT IN), Inhale As Needed., Disp: , Rfl:   •  cetirizine (ZyrTEC) 10 MG tablet, Take 10 mg by mouth., Disp: , Rfl:   •  Cholecalciferol (VITAMIN D3) 2000 UNITS capsule, Take 1 capsule by mouth daily., Disp: , Rfl:   •  clarithromycin (BIAXIN) 500 MG tablet, Take 1 tablet twice a day with food, Disp: 28 tablet, Rfl: 0  •  esomeprazole (nexIUM) 40 MG capsule, Take 40 mg by mouth Every Morning Before Breakfast., Disp: , Rfl:   •  fluticasone (FLONASE) 50 MCG/ACT nasal spray, into the nostril(s) as directed by provider Daily., Disp: , Rfl:   •  meloxicam (MOBIC) 15 MG tablet, Take 15 mg by mouth Daily As Needed., Disp: , Rfl:   •  metroNIDAZOLE (FLAGYL) 250 MG tablet, 1 tablet po four times daily for 14 days, Disp: 56 tablet, Rfl: 0  •  naproxen (NAPROSYN) 500 MG tablet, Take  by mouth., Disp: , Rfl:   •  ondansetron (ZOFRAN) 4 MG tablet, Take 1 tablet by mouth Every 8 (Eight) Hours As Needed for Nausea or Vomiting., Disp: 30 tablet, Rfl: 1  •  Probiotic capsule, Take 1 capsule by mouth Daily., Disp: 30 capsule, Rfl: 1  •  SUMAtriptan (IMITREX) 25 MG tablet, Take 1 tablet by mouth Daily As Needed (migraine)., Disp: 9 tablet, Rfl: 4  •  topiramate (TOPAMAX) 200 MG tablet, Take 1 tablet by mouth 2 (Two) Times a Day.,  "Disp: 60 tablet, Rfl: 4  •  venlafaxine XR (EFFEXOR-XR) 75 MG 24 hr capsule, Take 75 mg by mouth Daily., Disp: , Rfl:   •  esomeprazole (nexIUM) 40 MG capsule, 1 capsule by mouth twice a day x 14 days, Disp: 28 capsule, Rfl: 0    Allergies   Allergen Reactions   • Penicillins Diarrhea   • Oxytetracycline Other (See Comments)     SHE DOESN'T KNOW WHAT THAT IS   • Other Diarrhea     \"Teramycin\"     /82   Pulse 77   Temp 97.9 °F (36.6 °C)   Resp 15   Ht 149.9 cm (59\")   Wt 65.8 kg (145 lb)   LMP  (LMP Unknown)   BMI 29.29 kg/m²     Physical Exam   Constitutional: She is oriented to person, place, and time. She appears well-developed and well-nourished. No distress.   HENT:   Head: Normocephalic and atraumatic.   Right Ear: Hearing and external ear normal.   Left Ear: Hearing and external ear normal.   Nose: Nose normal.   Mouth/Throat: Oropharynx is clear and moist and mucous membranes are normal. Mucous membranes are not pale, not dry and not cyanotic. No oral lesions. No oropharyngeal exudate.   Eyes: Conjunctivae and EOM are normal. Right eye exhibits no discharge. Left eye exhibits no discharge.   Neck: Trachea normal. Neck supple. No JVD present. No edema present. No thyroid mass and no thyromegaly present.   Cardiovascular: Normal rate, regular rhythm, S2 normal and normal heart sounds. Exam reveals no gallop, no S3 and no friction rub.   No murmur heard.  Pulmonary/Chest: Effort normal and breath sounds normal. No respiratory distress. She exhibits no tenderness.   Abdominal: Normal appearance and bowel sounds are normal. She exhibits no distension, no ascites and no mass. There is no splenomegaly or hepatomegaly. There is no tenderness. There is no rigidity, no rebound and no guarding. No hernia.     Vascular Status -  Her right foot exhibits no edema. Her left foot exhibits no edema.  Lymphadenopathy:     She has no cervical adenopathy.        Left: No supraclavicular adenopathy present. "   Neurological: She is alert and oriented to person, place, and time. She has normal strength. No cranial nerve deficit or sensory deficit.   Skin: No rash noted. She is not diaphoretic. No cyanosis. No pallor. Nails show no clubbing.   Psychiatric: She has a normal mood and affect.   Nursing note and vitals reviewed.  Stigmata of chronic liver disease:  None.  Asterixis:  None.    Laboratory Tests:   Upon review of records:     Dated 9/19/2019 glucose 86 BUN 18 creatinine 0.86 sodium 145 potassium 4.4 chloride 110 CO2 18 calcium 9.4 albumin 4.8 total bilirubin <0.2 alkaline phosphatase 68 ALT 13 AST 15 WBC 7.0 hematocrit 40.8 hemoglobin 13.4 platelet count 215 MCV 88  Lipase 43, amylase 72, H. pylori breath test: Negative     Abdominal Imaging:  Upon review of records:     Gallbladder ultrasound dated 10/3/2019 reveals no evidence of gallstones, wall thickening or pericholecystic fluid is identified. Visualized liver parenchyma appears normal. No intrahepatic duct dilatation is identified.No extrahepatic biliary ductal dilatation is identified. There are 2 right renal cysts identified. The largest measures up to 2.1 cm.     Procedures:  Upon review of records:     Colonoscopy dated 4/18/2016 reveals scant left-sided diverticulosis.  Colon polyps.  Internal hemorrhoids.  No endoscopic evidence of ileitis or colitis was seen.  Random biopsies were obtained from the colon upon withdrawal of the scope.  Cecum polyp x4 biopsy reveals portion of tubular adenoma.  Fragments of colonic mucosa with prominent lymphoid aggregates.  No high-grade dysplasia identified.  Random colon biopsy reveals multiple portions of colonic mucosa with no significant histopathologic change.  No features of acute colitis, chronic colitis, collagenous colitis or lymphocytic colitis.  Rectal polyps reveals rectal mucosa with focal hyperplastic changes.    EGD dated 11/11/2019 reveals subtle concentric rings involving the proximal, mid and distal  esophagus.  Status post serial dilation to 18 mm.  Schatzki's type ring.  Small sliding hiatal hernia less than 3 cm.  Erythematous gastritis.  Gastric polyps.  Second portion of duodenum biopsy reveals no significant pathologic abnormalities.  Antrum body and fundus biopsy reveals H. pylori positive.  No intestinal metaplasia or dysplasia.  Gastric polyp cardial biopsy reveals single fragment of gastric mucosa suggestive of hyperplastic polyp.  Fragments of antral type gastric mucosa consistent with chronic gastritis and reactive changes.  No intestinal metaplasia or dysplasia.  Esophagus biopsy mid and distal revealed squamous mucosa with no significant pathologic abnormality.  No intestinal metaplasia or dysplasia.  Stomach body biopsy of polyp reveals chronic active gastritis.  No intestinal metaplasia or dysplasia.    Assessment:      ICD-10-CM ICD-9-CM   1. Weight loss R63.4 783.21   2. Nausea R11.0 787.02   3. Fatigue, unspecified type R53.83 780.79   4. Left lower quadrant abdominal pain R10.32 789.04   5. Diarrhea, unspecified type R19.7 787.91   6. Dysphagia, unspecified type R13.10 787.20   7. Colon cancer screening Z12.11 V76.51   8. Helicobacter pylori gastritis K29.70 535.10   9. Esophagitis K20.9 530.10     Plan/  Patient Instructions   1. Antireflux measures: Avoid fried, fatty foods, alcohol, chocolate, coffee, tea,  soft drinks, peppermint and spearmint, spicy foods, tomatoes and tomato based foods, onion based foods, and smoking. Other antireflux measures include weight reduction if overweight, avoiding tight clothing around the abdomen, elevating the head of the bed 6 inches with blocks under the head board, and don't drink or eat before going to bed and avoid lying down immediately after meals.  2. Nexium 40 mg 1 by mouth in the am 30 minutes before breakfast.  3. Zofran 4 mg 1 po every 8 hours as needed for nausea.  4. Treatment for H. Pylori:       A. Metronidazole 250 mg 1 tablets by mouth  four times a day x 14 days.       B. Clarithromycin 500 mg 1 tablet by mouth twice a day x 14 days.       C. Nexium 40 mg 1 tablet by mouth twice a day x 14 days.  5. Patient may take probiotics while taking antibiotics  6. CCK HIDA scan.  7. CBC, CMP, TSH, Lipase, IgA, tTg IgA  8. The patient may call for results.  9. Colonoscopy: Description of the procedure, risks, benefits, alternatives and options, including nonoperative options, were discussed with the patient in detail. The patient understands and wishes to discuss with her PCP first. The patient will call back to schedule.  10. Follow up: Follow up: 4-6 weeks     Gregory Rodriguez, APRN

## 2019-12-11 ENCOUNTER — PRIOR AUTHORIZATION (OUTPATIENT)
Dept: GASTROENTEROLOGY | Facility: CLINIC | Age: 59
End: 2019-12-11

## 2019-12-30 ENCOUNTER — HOSPITAL ENCOUNTER (OUTPATIENT)
Dept: NUCLEAR MEDICINE | Facility: HOSPITAL | Age: 59
Discharge: HOME | End: 2019-12-30

## 2019-12-30 ENCOUNTER — LAB (OUTPATIENT)
Dept: LAB | Facility: HOSPITAL | Age: 59
End: 2019-12-30

## 2019-12-30 DIAGNOSIS — R10.32 LEFT LOWER QUADRANT ABDOMINAL PAIN: ICD-10-CM

## 2019-12-30 DIAGNOSIS — R63.4 WEIGHT LOSS: ICD-10-CM

## 2019-12-30 DIAGNOSIS — R11.0 NAUSEA: ICD-10-CM

## 2019-12-30 DIAGNOSIS — R63.4 WEIGHT LOSS: Chronic | ICD-10-CM

## 2019-12-30 DIAGNOSIS — R11.0 NAUSEA: Chronic | ICD-10-CM

## 2019-12-30 DIAGNOSIS — R19.7 DIARRHEA, UNSPECIFIED TYPE: ICD-10-CM

## 2019-12-30 DIAGNOSIS — R53.83 FATIGUE, UNSPECIFIED TYPE: ICD-10-CM

## 2019-12-30 LAB
ALBUMIN SERPL-MCNC: 4.6 G/DL (ref 3.5–5.2)
ALBUMIN/GLOB SERPL: 1.6 G/DL
ALP SERPL-CCNC: 68 U/L (ref 39–117)
ALT SERPL W P-5'-P-CCNC: 11 U/L (ref 1–33)
ANION GAP SERPL CALCULATED.3IONS-SCNC: 13.4 MMOL/L (ref 5–15)
AST SERPL-CCNC: 14 U/L (ref 1–32)
BASOPHILS # BLD AUTO: 0.05 10*3/MM3 (ref 0–0.2)
BASOPHILS NFR BLD AUTO: 0.7 % (ref 0–1.5)
BILIRUB SERPL-MCNC: <0.2 MG/DL (ref 0.2–1.2)
BUN BLD-MCNC: 13 MG/DL (ref 6–20)
BUN/CREAT SERPL: 18.1 (ref 7–25)
CALCIUM SPEC-SCNC: 9.3 MG/DL (ref 8.6–10.5)
CHLORIDE SERPL-SCNC: 111 MMOL/L (ref 98–107)
CO2 SERPL-SCNC: 20.6 MMOL/L (ref 22–29)
CREAT BLD-MCNC: 0.72 MG/DL (ref 0.57–1)
DEPRECATED RDW RBC AUTO: 40 FL (ref 37–54)
EOSINOPHIL # BLD AUTO: 0.16 10*3/MM3 (ref 0–0.4)
EOSINOPHIL NFR BLD AUTO: 2.2 % (ref 0.3–6.2)
ERYTHROCYTE [DISTWIDTH] IN BLOOD BY AUTOMATED COUNT: 12.7 % (ref 12.3–15.4)
GFR SERPL CREATININE-BSD FRML MDRD: 83 ML/MIN/1.73
GLOBULIN UR ELPH-MCNC: 2.8 GM/DL
GLUCOSE BLD-MCNC: 106 MG/DL (ref 65–99)
HCT VFR BLD AUTO: 41.4 % (ref 34–46.6)
HGB BLD-MCNC: 14.2 G/DL (ref 12–15.9)
IGA1 MFR SER: 102 MG/DL (ref 70–400)
IMM GRANULOCYTES # BLD AUTO: 0.02 10*3/MM3 (ref 0–0.05)
IMM GRANULOCYTES NFR BLD AUTO: 0.3 % (ref 0–0.5)
LIPASE SERPL-CCNC: 40 U/L (ref 13–60)
LYMPHOCYTES # BLD AUTO: 1.76 10*3/MM3 (ref 0.7–3.1)
LYMPHOCYTES NFR BLD AUTO: 24.6 % (ref 19.6–45.3)
MCH RBC QN AUTO: 29.9 PG (ref 26.6–33)
MCHC RBC AUTO-ENTMCNC: 34.3 G/DL (ref 31.5–35.7)
MCV RBC AUTO: 87.2 FL (ref 79–97)
MONOCYTES # BLD AUTO: 0.36 10*3/MM3 (ref 0.1–0.9)
MONOCYTES NFR BLD AUTO: 5 % (ref 5–12)
NEUTROPHILS # BLD AUTO: 4.8 10*3/MM3 (ref 1.7–7)
NEUTROPHILS NFR BLD AUTO: 67.2 % (ref 42.7–76)
NRBC BLD AUTO-RTO: 0 /100 WBC (ref 0–0.2)
PLATELET # BLD AUTO: 250 10*3/MM3 (ref 140–450)
PMV BLD AUTO: 10.9 FL (ref 6–12)
POTASSIUM BLD-SCNC: 4.4 MMOL/L (ref 3.5–5.2)
PROT SERPL-MCNC: 7.4 G/DL (ref 6–8.5)
RBC # BLD AUTO: 4.75 10*6/MM3 (ref 3.77–5.28)
SODIUM BLD-SCNC: 145 MMOL/L (ref 136–145)
TSH SERPL DL<=0.05 MIU/L-ACNC: 1.9 UIU/ML (ref 0.27–4.2)
WBC NRBC COR # BLD: 7.15 10*3/MM3 (ref 3.4–10.8)

## 2019-12-30 PROCEDURE — 36415 COLL VENOUS BLD VENIPUNCTURE: CPT

## 2019-12-30 PROCEDURE — 85025 COMPLETE CBC W/AUTO DIFF WBC: CPT

## 2019-12-30 PROCEDURE — A9537 TC99M MEBROFENIN: HCPCS | Performed by: NURSE PRACTITIONER

## 2019-12-30 PROCEDURE — 84443 ASSAY THYROID STIM HORMONE: CPT

## 2019-12-30 PROCEDURE — 83690 ASSAY OF LIPASE: CPT

## 2019-12-30 PROCEDURE — 82784 ASSAY IGA/IGD/IGG/IGM EACH: CPT

## 2019-12-30 PROCEDURE — 0 TECHNETIUM TC 99M MEBROFENIN KIT: Performed by: NURSE PRACTITIONER

## 2019-12-30 PROCEDURE — 80053 COMPREHEN METABOLIC PANEL: CPT

## 2019-12-30 PROCEDURE — 25010000002 SINCALIDE PER 5 MCG: Performed by: NURSE PRACTITIONER

## 2019-12-30 PROCEDURE — 83516 IMMUNOASSAY NONANTIBODY: CPT

## 2019-12-30 PROCEDURE — 78227 HEPATOBIL SYST IMAGE W/DRUG: CPT

## 2019-12-30 RX ORDER — KIT FOR THE PREPARATION OF TECHNETIUM TC 99M MEBROFENIN 45 MG/10ML
1 INJECTION, POWDER, LYOPHILIZED, FOR SOLUTION INTRAVENOUS
Status: COMPLETED | OUTPATIENT
Start: 2019-12-30 | End: 2019-12-30

## 2019-12-30 RX ADMIN — MEBROFENIN 1 DOSE: 45 INJECTION, POWDER, LYOPHILIZED, FOR SOLUTION INTRAVENOUS at 09:30

## 2019-12-30 RX ADMIN — SINCALIDE 1.3 MCG: 5 INJECTION, POWDER, LYOPHILIZED, FOR SOLUTION INTRAVENOUS at 10:40

## 2019-12-31 LAB — TTG IGA SER-ACNC: <2 U/ML (ref 0–3)

## 2020-12-02 ENCOUNTER — HOSPITAL ENCOUNTER (EMERGENCY)
Facility: HOSPITAL | Age: 60
Discharge: HOME | End: 2020-12-02
Attending: STUDENT IN AN ORGANIZED HEALTH CARE EDUCATION/TRAINING PROGRAM | Admitting: STUDENT IN AN ORGANIZED HEALTH CARE EDUCATION/TRAINING PROGRAM

## 2020-12-02 VITALS
OXYGEN SATURATION: 99 % | HEIGHT: 59 IN | RESPIRATION RATE: 17 BRPM | DIASTOLIC BLOOD PRESSURE: 68 MMHG | WEIGHT: 150 LBS | TEMPERATURE: 98.4 F | BODY MASS INDEX: 30.24 KG/M2 | HEART RATE: 72 BPM | SYSTOLIC BLOOD PRESSURE: 106 MMHG

## 2020-12-02 DIAGNOSIS — G43.809 OTHER MIGRAINE WITHOUT STATUS MIGRAINOSUS, NOT INTRACTABLE: Primary | ICD-10-CM

## 2020-12-02 PROCEDURE — 25010000002 DIPHENHYDRAMINE PER 50 MG: Performed by: STUDENT IN AN ORGANIZED HEALTH CARE EDUCATION/TRAINING PROGRAM

## 2020-12-02 PROCEDURE — 25010000002 DROPERIDOL PER 5 MG: Performed by: STUDENT IN AN ORGANIZED HEALTH CARE EDUCATION/TRAINING PROGRAM

## 2020-12-02 PROCEDURE — 25010000002 KETOROLAC TROMETHAMINE PER 15 MG: Performed by: STUDENT IN AN ORGANIZED HEALTH CARE EDUCATION/TRAINING PROGRAM

## 2020-12-02 PROCEDURE — 99283 EMERGENCY DEPT VISIT LOW MDM: CPT

## 2020-12-02 PROCEDURE — 96375 TX/PRO/DX INJ NEW DRUG ADDON: CPT

## 2020-12-02 PROCEDURE — 96374 THER/PROPH/DIAG INJ IV PUSH: CPT

## 2020-12-02 RX ORDER — DROPERIDOL 2.5 MG/ML
2.5 INJECTION, SOLUTION INTRAMUSCULAR; INTRAVENOUS ONCE
Status: COMPLETED | OUTPATIENT
Start: 2020-12-02 | End: 2020-12-02

## 2020-12-02 RX ORDER — DIPHENHYDRAMINE HYDROCHLORIDE 50 MG/ML
25 INJECTION INTRAMUSCULAR; INTRAVENOUS ONCE
Status: COMPLETED | OUTPATIENT
Start: 2020-12-02 | End: 2020-12-02

## 2020-12-02 RX ORDER — KETOROLAC TROMETHAMINE 30 MG/ML
15 INJECTION, SOLUTION INTRAMUSCULAR; INTRAVENOUS ONCE
Status: COMPLETED | OUTPATIENT
Start: 2020-12-02 | End: 2020-12-02

## 2020-12-02 RX ADMIN — DROPERIDOL 2.5 MG: 2.5 INJECTION, SOLUTION INTRAMUSCULAR; INTRAVENOUS at 07:58

## 2020-12-02 RX ADMIN — DIPHENHYDRAMINE HYDROCHLORIDE 25 MG: 50 INJECTION, SOLUTION INTRAMUSCULAR; INTRAVENOUS at 07:58

## 2020-12-02 RX ADMIN — KETOROLAC TROMETHAMINE 15 MG: 30 INJECTION, SOLUTION INTRAMUSCULAR at 07:58

## 2020-12-02 RX ADMIN — SODIUM CHLORIDE 500 ML: 9 INJECTION, SOLUTION INTRAVENOUS at 08:18

## 2020-12-02 NOTE — ED PROVIDER NOTES
"Subjective   60-year-old female who presents to the emergency department with a migraine headache that has been persistent for 3 days.  Patient states she has chronic migraines and had medication she was supposed to  at the pharmacy today, but she could not take it anymore.  As her migraine is a 10 out of 10, constant, is made worse by bright lights or loud noises.  She states normally she can lay in a dark room that it will go away, but this time she is not been able to do that.  She has tried Tylenol.  Patient denies fever, chills or sweats.  She has no chest pain or shortness of breath.  She does endorse nausea.          Review of Systems   All other systems reviewed and are negative.      Past Medical History:   Diagnosis Date   • Abdominal pain    • Anxiety    • Asthma    • Back pain    • Colon polyp 2016   • COPD (chronic obstructive pulmonary disease) (CMS/HCC)    • Depression    • Diarrhea    • Dyspepsia    • Full dentures    • GERD (gastroesophageal reflux disease)    • Infectious viral hepatitis    • Migraine    • Migraines    • Nausea    • Osteoarthritis    • Vision problem        Allergies   Allergen Reactions   • Penicillins Diarrhea   • Oxytetracycline Other (See Comments)     SHE DOESN'T KNOW WHAT THAT IS   • Other Diarrhea     \"Teramycin\"       Past Surgical History:   Procedure Laterality Date   •  SECTION     • COLONOSCOPY  2016   • ENDOSCOPY N/A 2019    Procedure: ESOPHAGOGASTRODUODENOSCOPY WITH BALLOON DILITATION, BIOPSIES AND POLYPECTOMIES;  Surgeon: Moses Watt MD;  Location: [facility] ENDOSCOPY;  Service: Gastroenterology   • TONSILLECTOMY      as a baby   • TUBAL ABDOMINAL LIGATION         Family History   Problem Relation Age of Onset   • Cirrhosis Neg Hx    • Liver cancer Neg Hx    • Liver disease Neg Hx    • Colon cancer Neg Hx    • Crohn's disease Neg Hx    • Ulcerative colitis Neg Hx    • Esophageal cancer Neg Hx    • Stomach cancer Neg " Hx        Social History     Socioeconomic History   • Marital status:      Spouse name: Not on file   • Number of children: Not on file   • Years of education: Not on file   • Highest education level: Not on file   Tobacco Use   • Smoking status: Never Smoker   • Smokeless tobacco: Never Used   Substance and Sexual Activity   • Alcohol use: Yes     Comment: moderate use   • Drug use: No   • Sexual activity: Defer           Objective   Physical Exam  Vitals signs and nursing note reviewed.     GEN: Appears uncomfortable, but nontoxic   head: Normocephalic, atraumatic  Eyes: Pupils equal round reactive to light, photophobia on exam  ENT: Posterior pharynx normal in appearance, oral mucosa is moist  Chest: Nontender to palpation  Cardiovascular: Regular rate  Lungs: Clear to auscultation bilaterally  Abdomen: Soft, nontender, nondistended, no peritoneal signs  Extremities: No edema, normal appearance  Neuro: GCS 15, alert and oriented ×3, cranial nerves II through XII grossly intact, strength 5 out of 5 bilaterally in upper and lower extremities, no dysmetria, no aphasia or dysarthria, sensation grossly intact in all 4 extremities to light touch  Psych: Anxious and tearful    Procedures           ED Course                                           MDM  Number of Diagnoses or Management Options  Diagnosis management comments: Differential diagnosis for this patient would include sinusitis with headache, intracranial neoplasm, migraine headache, tension headache, cluster headache, subarachnoid hemorrhage, meningitis, or other concerns.  Highly doubt meningitis at this time as the patient is afebrile and nontoxic in appearance.  Doubt subarachnoid hemorrhage at this time as the patient has had similar headaches in the past  Patient was treated with my usual migraine headache cocktail with good symptom control       Amount and/or Complexity of Data Reviewed  Decide to obtain previous medical records or to obtain  history from someone other than the patient: yes  Obtain history from someone other than the patient: yes  Review and summarize past medical records: yes        Final diagnoses:   Other migraine without status migrainosus, not intractable            Luis Bess MD  12/02/20 0712

## 2023-11-02 ENCOUNTER — TRANSCRIBE ORDERS (OUTPATIENT)
Dept: ADMINISTRATIVE | Facility: HOSPITAL | Age: 63
End: 2023-11-02
Payer: MEDICARE

## 2023-11-02 DIAGNOSIS — Z12.31 ENCOUNTER FOR SCREENING MAMMOGRAM FOR MALIGNANT NEOPLASM OF BREAST: Primary | ICD-10-CM

## 2023-11-17 ENCOUNTER — HOSPITAL ENCOUNTER (OUTPATIENT)
Dept: MAMMOGRAPHY | Facility: HOSPITAL | Age: 63
Discharge: HOME | End: 2023-11-17
Admitting: FAMILY MEDICINE
Payer: MEDICARE

## 2023-11-17 DIAGNOSIS — Z12.31 ENCOUNTER FOR SCREENING MAMMOGRAM FOR MALIGNANT NEOPLASM OF BREAST: ICD-10-CM

## 2023-11-17 PROCEDURE — 77063 BREAST TOMOSYNTHESIS BI: CPT

## 2023-11-17 PROCEDURE — 77067 SCR MAMMO BI INCL CAD: CPT

## (undated) DEVICE — Device

## (undated) DEVICE — FRCP BIOP COLD ENDOJAW ALLGTR W/NDL 2.8X2300MM BLU

## (undated) DEVICE — HYBRID TUBING/CAP SET FOR OLYMPUS® SCOPES: Brand: ERBE

## (undated) DEVICE — CONMED SCOPE SAVER BITE BLOCK, 20X27 MM: Brand: SCOPE SAVER

## (undated) DEVICE — Device: Brand: DEFENDO AIR/WATER/SUCTION AND BIOPSY VALVE

## (undated) DEVICE — ENDOSCOPY PORT CONNECTOR FOR OLYMPUS® SCOPES: Brand: ERBE

## (undated) DEVICE — SUCTION CANISTER, 1500CC, RIGID: Brand: DEROYAL

## (undated) DEVICE — DEV INFL ALLIANCE2 SYS

## (undated) DEVICE — HIGH PERFORMANCE BALLOON DILATATION CATHETER: Brand: MAXFORCE TTS